# Patient Record
Sex: MALE | Race: WHITE | Employment: UNEMPLOYED | URBAN - METROPOLITAN AREA
[De-identification: names, ages, dates, MRNs, and addresses within clinical notes are randomized per-mention and may not be internally consistent; named-entity substitution may affect disease eponyms.]

---

## 2021-06-03 ENCOUNTER — HOSPITAL ENCOUNTER (EMERGENCY)
Age: 61
Discharge: LEFT BEFORE DISCHARGE | End: 2021-06-03
Payer: MEDICAID

## 2021-06-03 ENCOUNTER — APPOINTMENT (OUTPATIENT)
Dept: GENERAL RADIOLOGY | Age: 61
End: 2021-06-03
Payer: MEDICAID

## 2021-06-03 ENCOUNTER — APPOINTMENT (OUTPATIENT)
Dept: CT IMAGING | Age: 61
End: 2021-06-03
Payer: MEDICAID

## 2021-06-03 VITALS
TEMPERATURE: 98 F | WEIGHT: 175 LBS | BODY MASS INDEX: 23.7 KG/M2 | DIASTOLIC BLOOD PRESSURE: 96 MMHG | HEIGHT: 72 IN | RESPIRATION RATE: 16 BRPM | SYSTOLIC BLOOD PRESSURE: 168 MMHG | OXYGEN SATURATION: 98 % | HEART RATE: 68 BPM

## 2021-06-03 DIAGNOSIS — M25.559 HIP PAIN: Primary | ICD-10-CM

## 2021-06-03 PROCEDURE — 73502 X-RAY EXAM HIP UNI 2-3 VIEWS: CPT

## 2021-06-03 PROCEDURE — 90471 IMMUNIZATION ADMIN: CPT | Performed by: NURSE PRACTITIONER

## 2021-06-03 PROCEDURE — 6360000002 HC RX W HCPCS: Performed by: NURSE PRACTITIONER

## 2021-06-03 PROCEDURE — 90715 TDAP VACCINE 7 YRS/> IM: CPT | Performed by: NURSE PRACTITIONER

## 2021-06-03 PROCEDURE — 6370000000 HC RX 637 (ALT 250 FOR IP): Performed by: NURSE PRACTITIONER

## 2021-06-03 PROCEDURE — 99284 EMERGENCY DEPT VISIT MOD MDM: CPT

## 2021-06-03 RX ORDER — HYDROCODONE BITARTRATE AND ACETAMINOPHEN 5; 325 MG/1; MG/1
1 TABLET ORAL EVERY 6 HOURS PRN
Qty: 8 TABLET | Refills: 0 | Status: SHIPPED | OUTPATIENT
Start: 2021-06-03 | End: 2021-06-05

## 2021-06-03 RX ORDER — OXYCODONE HYDROCHLORIDE AND ACETAMINOPHEN 5; 325 MG/1; MG/1
1 TABLET ORAL ONCE
Status: COMPLETED | OUTPATIENT
Start: 2021-06-03 | End: 2021-06-03

## 2021-06-03 RX ADMIN — OXYCODONE HYDROCHLORIDE AND ACETAMINOPHEN 1 TABLET: 5; 325 TABLET ORAL at 15:15

## 2021-06-03 RX ADMIN — TETANUS TOXOID, REDUCED DIPHTHERIA TOXOID AND ACELLULAR PERTUSSIS VACCINE, ADSORBED 0.5 ML: 5; 2.5; 8; 8; 2.5 SUSPENSION INTRAMUSCULAR at 15:48

## 2021-06-03 ASSESSMENT — PAIN DESCRIPTION - PAIN TYPE: TYPE: ACUTE PAIN

## 2021-06-03 ASSESSMENT — PAIN SCALES - GENERAL
PAINLEVEL_OUTOF10: 10
PAINLEVEL_OUTOF10: 10

## 2021-06-03 ASSESSMENT — PAIN DESCRIPTION - ORIENTATION: ORIENTATION: RIGHT

## 2021-06-03 ASSESSMENT — PAIN DESCRIPTION - DESCRIPTORS: DESCRIPTORS: ACHING;SHARP

## 2021-06-03 ASSESSMENT — PAIN DESCRIPTION - LOCATION: LOCATION: HIP

## 2021-06-03 NOTE — ED PROVIDER NOTES
[address]  Department of Emergency Medicine   ED  Encounter Note  Admit Date/RoomTime: 6/3/2021  2:10 PM  ED Room:     NAME: Luis Gardiner  : 1960  MRN: [id number]     Chief Complaint:  Fall (fell off a pirch x 3 days ago injuring right hip; denies hitting head)    History of Present Illness       Luis Gardiner is a 62 y.o. old male who presents to the emergency department for complaint of right hip pain for the past 3 days. He reported that 3 days ago he was moving things on his porch and he accidentally stepped off the porch falling. States that the fall was approximately 3 to 4 feet. Denies striking his head. No use of blood thinners. No LOC. Reports that since the incident he has been having difficulty ambulating due to right hip pain. Denies back pain, fever, chills, nausea, vomiting, headache, neck pain, chest pain, abdominal pain, shortness of breath, numbness, tingling, paresthesias, lightheadedness, dizziness, syncope, or any other complaints at this time. Since onset the symptoms have been unchanged and moderate in severity. Denies any previous injuries to right hip. His pain is aggraveated by any movement, lifting, standing or walking, relieved by nothing. Reports that he has been using Tylenol and ibuprofen with no improvement at all. Tetanus Status: more than 5 years ago. ROS   Pertinent positives and negatives are stated within HPI, all other systems reviewed and are negative. Past Medical History:  has no past medical history on file. Surgical History:  has a past surgical history that includes back surgery. Social History:  reports that he has been smoking. He has been smoking about 1.00 pack per day. He has never used smokeless tobacco. He reports current alcohol use. He reports that he does not use drugs. Family History: family history is not on file. Allergies: Patient has no known allergies. Orientation age-appropriate. Motor functions intact. Lab / Imaging Results   (All laboratory and radiology results have been personally reviewed by myself)  Labs:  No results found for this visit on 06/03/21. Imaging: All Radiology results interpreted by Radiologist unless otherwise noted. XR HIP 2-3 VW W PELVIS RIGHT   Final Result   No acute abnormality of the hip. ED Course / Medical Decision Making     Medications   oxyCODONE-acetaminophen (PERCOCET) 5-325 MG per tablet 1 tablet (1 tablet Oral Given 6/3/21 1515)   Tetanus-Diphth-Acell Pertussis (BOOSTRIX) injection 0.5 mL (0.5 mLs Intramuscular Given 6/3/21 1548)        Re-examination:  6/3/21       Time:1600: I spoke with patient regarding results of right hip and pelvic x-rays which showed no fracture or malalignment. Due to his significant pain and difficulty bearing weight on his right leg plan is for CT scan. Patient is in agreement. 1645: Patient reported that due to family issues he is unable to stay to have his CT scan. I did discuss with him in detail that further imaging would be indicated, as I still have done for possible fracture of right hip. He is aware of my concerns but states that he is unable to stay and has been hobbling for the past 3 days. He reported that he would return if needed. He subsequently signed out AMA. Consult(s):   None    Procedure(s):   none    MDM:   Imaging was obtained based on moderate suspicion for fracture / bony abnormality as per history/physical findings. Patient reported that 2 days ago he accidentally fell off his porch which was approximately 3 to 4 feet high. Denies striking his head. No LOC. No use of blood thinners. He sustained a small laceration to his right fifth digit. His tetanus was updated. Wound care provided and wound care instructions discussed. He complained of pain to his right hip and difficulty walking for the past 3 days.   On physical exam he had extreme difficulty lifting his right leg up onto the bed. No neurovascular deficits. No sensory deficits. Imaging of pelvis and right hip showed no acute osseous abnormalities or malalignment. Due to his physical exam I discussed with him in detail that a CT scan would be indicated at this time to further evaluate for possible underlying fracture. At that time he was in agreement. He then reported that due to family issues he needed to leave the ED. I discussed with him in detail that a hip fracture would require surgical intervention. He continued to state that he is unable to stay for the CT scan and would return if needed. He subsequently signed out AMA. This patient has chosen to leave against medical advice. I, Kenneth Tadeo CNP has personally explained to them that choosing to do so may result in permanent bodily harm, disability, disfigurement, or death. I Discussed at length that without further evaluation and monitoring there may be unforeseen circumstances and deterioration causing permanent bodily harm or death as a result of their choice. They are alert, oriented, and have the capacity and are competent at this time to make this decision. They state that they are aware of the serious risks as explained, but they continue to wish to leave against medical advice. In light of their decision to leave against medical advice, follow-up has been arranged and they are aware of the importance of following up as instructed. They have been advised that they should return to the ED immediately if they change their mind at any time, or if their condition begins to change or worsen. This was witnessed by [facility] as well. Plan of Care/Counseling:  I reviewed today's visit with the patient in addition to providing specific details for the plan of care and counseling regarding the diagnosis and prognosis. Questions are answered at this time and are agreeable with the plan. Assessment      1. Hip pain      Plan   Left Against Medical Advice. Patient condition is stable    New Medications     Discharge Medication List as of 6/3/2021  4:55 PM      START taking these medications    Details   HYDROcodone-acetaminophen (NORCO) 5-325 MG per tablet Take 1 tablet by mouth every 6 hours as needed for Pain for up to 2 days. Intended supply: 3 days. Take lowest dose possible to manage pain, Disp-8 tablet, R-0Print           Electronically signed by MARIA LUZ Silva CNP   DD: 6/3/21  **This report was transcribed using voice recognition software. Every effort was made to ensure accuracy; however, inadvertent computerized transcription errors may be present.   END OF ED PROVIDER NOTE      MARIA LUZ Silva CNP  06/04/21 0685

## 2021-06-07 ENCOUNTER — APPOINTMENT (OUTPATIENT)
Dept: CT IMAGING | Age: 61
End: 2021-06-07
Payer: MEDICAID

## 2021-06-07 ENCOUNTER — HOSPITAL ENCOUNTER (EMERGENCY)
Age: 61
Discharge: HOME | End: 2021-06-07
Attending: EMERGENCY MEDICINE
Payer: MEDICAID

## 2021-06-07 VITALS
RESPIRATION RATE: 16 BRPM | OXYGEN SATURATION: 95 % | HEIGHT: 72 IN | WEIGHT: 175 LBS | BODY MASS INDEX: 23.7 KG/M2 | HEART RATE: 74 BPM | SYSTOLIC BLOOD PRESSURE: 162 MMHG | DIASTOLIC BLOOD PRESSURE: 89 MMHG | TEMPERATURE: 97.7 F

## 2021-06-07 DIAGNOSIS — M25.551 RIGHT HIP PAIN: Primary | ICD-10-CM

## 2021-06-07 PROCEDURE — 99283 EMERGENCY DEPT VISIT LOW MDM: CPT

## 2021-06-07 PROCEDURE — 72192 CT PELVIS W/O DYE: CPT

## 2021-06-07 RX ORDER — IBUPROFEN 400 MG/1
600 TABLET ORAL EVERY 6 HOURS PRN
COMMUNITY
End: 2021-06-07

## 2021-06-07 RX ORDER — LIDOCAINE 50 MG/G
1 PATCH TOPICAL DAILY
Qty: 10 PATCH | Refills: 0 | Status: SHIPPED | OUTPATIENT
Start: 2021-06-07 | End: 2021-06-17

## 2021-06-07 RX ORDER — NAPROXEN 500 MG/1
500 TABLET ORAL 2 TIMES DAILY PRN
Qty: 60 TABLET | Refills: 0 | Status: SHIPPED | OUTPATIENT
Start: 2021-06-07

## 2021-06-07 ASSESSMENT — PAIN DESCRIPTION - FREQUENCY: FREQUENCY: CONTINUOUS

## 2021-06-07 ASSESSMENT — PAIN DESCRIPTION - ORIENTATION: ORIENTATION: RIGHT

## 2021-06-07 ASSESSMENT — PAIN DESCRIPTION - LOCATION: LOCATION: HIP

## 2021-06-07 ASSESSMENT — PAIN SCALES - GENERAL: PAINLEVEL_OUTOF10: 4

## 2021-06-07 ASSESSMENT — PAIN DESCRIPTION - ONSET: ONSET: GRADUAL

## 2021-06-07 ASSESSMENT — PAIN DESCRIPTION - PAIN TYPE: TYPE: ACUTE PAIN

## 2021-06-07 ASSESSMENT — PAIN DESCRIPTION - PROGRESSION: CLINICAL_PROGRESSION: GRADUALLY WORSENING

## 2021-06-07 NOTE — ED PROVIDER NOTES
HPI:  6/7/21,   Time: 3:03 PM EDT       March Alyce is a 62 y.o. male presenting to the ED for fall/rt hip pain, beginning 8 days ago. The complaint has been persistent, mild in severity, and worsened by movement of hip and bearing weight. Seen here for same, neg xr, told to get ct, left w/o getting, cont pain. Able to walk. Nothing makes bettrer. No urinary sx/numnbess/tingling. No hx hip prob prvs    Review of Systems:   Pertinent positives and negatives are stated within HPI, all other systems reviewed and are negative.          --------------------------------------------- PAST HISTORY ---------------------------------------------  Past Medical History:  has a past medical history of Trauma. Past Surgical History:  has a past surgical history that includes back surgery and brain surgery. Social History:  reports that he has been smoking. He has been smoking about 1.00 pack per day. He has never used smokeless tobacco. He reports current alcohol use. He reports that he does not use drugs. Family History: family history is not on file. The patients home medications have been reviewed. Allergies: Patient has no known allergies. ---------------------------------------------------PHYSICAL EXAM--------------------------------------    Constitutional/General: Alert and oriented x3, well appearing, non toxic in NAD  Head: Normocephalic and atraumatic  Musculoskeletal: Moves all extremities x 4. Warm and well perfused, no clubbing, cyanosis, or edema. Capillary refill <3 seconds, mild right lat hip ttp, nvi in all ext, intact pulses distal  Integument: skin warm and dry. No rashes.    Lymphatic: no lymphadenopathy noted  Neurologic: GCS 15, no focal deficits, symmetric strength 5/5 in the upper and lower extremities bilaterally  Psychiatric: Normal Affect    -------------------------------------------------- RESULTS -------------------------------------------------  I have personally